# Patient Record
Sex: MALE | Race: WHITE | NOT HISPANIC OR LATINO | Employment: OTHER | URBAN - METROPOLITAN AREA
[De-identification: names, ages, dates, MRNs, and addresses within clinical notes are randomized per-mention and may not be internally consistent; named-entity substitution may affect disease eponyms.]

---

## 2023-08-04 ENCOUNTER — MEDICAL CORRESPONDENCE (OUTPATIENT)
Dept: HEALTH INFORMATION MANAGEMENT | Facility: CLINIC | Age: 48
End: 2023-08-04

## 2023-08-07 ENCOUNTER — TRANSCRIBE ORDERS (OUTPATIENT)
Dept: OTHER | Age: 48
End: 2023-08-07

## 2023-08-07 DIAGNOSIS — K76.0 HEPATIC STEATOSIS DETERMINED BY BIOPSY OF LIVER: Primary | ICD-10-CM

## 2024-02-19 ENCOUNTER — OFFICE VISIT (OUTPATIENT)
Dept: GASTROENTEROLOGY | Facility: CLINIC | Age: 49
End: 2024-02-19
Attending: NURSE PRACTITIONER
Payer: COMMERCIAL

## 2024-02-19 VITALS
BODY MASS INDEX: 41.75 KG/M2 | HEIGHT: 73 IN | HEART RATE: 62 BPM | WEIGHT: 315 LBS | SYSTOLIC BLOOD PRESSURE: 153 MMHG | DIASTOLIC BLOOD PRESSURE: 93 MMHG | OXYGEN SATURATION: 95 %

## 2024-02-19 DIAGNOSIS — K76.0 HEPATIC STEATOSIS: Primary | ICD-10-CM

## 2024-02-19 LAB
ALBUMIN SERPL BCG-MCNC: 4.3 G/DL (ref 3.5–5.2)
ALP SERPL-CCNC: 57 U/L (ref 40–150)
ALT SERPL W P-5'-P-CCNC: 86 U/L (ref 0–70)
ANION GAP SERPL CALCULATED.3IONS-SCNC: 8 MMOL/L (ref 7–15)
AST SERPL W P-5'-P-CCNC: 84 U/L (ref 0–45)
BILIRUB SERPL-MCNC: 0.5 MG/DL
BUN SERPL-MCNC: 5.2 MG/DL (ref 6–20)
CALCIUM SERPL-MCNC: 9.9 MG/DL (ref 8.6–10)
CHLORIDE SERPL-SCNC: 100 MMOL/L (ref 98–107)
CREAT SERPL-MCNC: 0.71 MG/DL (ref 0.67–1.17)
DEPRECATED HCO3 PLAS-SCNC: 30 MMOL/L (ref 22–29)
EGFRCR SERPLBLD CKD-EPI 2021: >90 ML/MIN/1.73M2
ERYTHROCYTE [DISTWIDTH] IN BLOOD BY AUTOMATED COUNT: 13.5 % (ref 10–15)
FERRITIN SERPL-MCNC: 81 NG/ML (ref 31–409)
GLUCOSE SERPL-MCNC: 97 MG/DL (ref 70–99)
HCT VFR BLD AUTO: 48.9 % (ref 40–53)
HGB BLD-MCNC: 15.9 G/DL (ref 13.3–17.7)
INR PPP: 1.07 (ref 0.85–1.15)
MCH RBC QN AUTO: 30.5 PG (ref 26.5–33)
MCHC RBC AUTO-ENTMCNC: 32.5 G/DL (ref 31.5–36.5)
MCV RBC AUTO: 94 FL (ref 78–100)
PLATELET # BLD AUTO: 194 10E3/UL (ref 150–450)
POTASSIUM SERPL-SCNC: 4.8 MMOL/L (ref 3.4–5.3)
PROT SERPL-MCNC: 8.5 G/DL (ref 6.4–8.3)
RBC # BLD AUTO: 5.21 10E6/UL (ref 4.4–5.9)
SODIUM SERPL-SCNC: 138 MMOL/L (ref 135–145)
WBC # BLD AUTO: 6.8 10E3/UL (ref 4–11)

## 2024-02-19 PROCEDURE — 99204 OFFICE O/P NEW MOD 45 MIN: CPT | Performed by: STUDENT IN AN ORGANIZED HEALTH CARE EDUCATION/TRAINING PROGRAM

## 2024-02-19 PROCEDURE — 80053 COMPREHEN METABOLIC PANEL: CPT | Performed by: STUDENT IN AN ORGANIZED HEALTH CARE EDUCATION/TRAINING PROGRAM

## 2024-02-19 PROCEDURE — 85610 PROTHROMBIN TIME: CPT | Performed by: STUDENT IN AN ORGANIZED HEALTH CARE EDUCATION/TRAINING PROGRAM

## 2024-02-19 PROCEDURE — 85027 COMPLETE CBC AUTOMATED: CPT | Performed by: STUDENT IN AN ORGANIZED HEALTH CARE EDUCATION/TRAINING PROGRAM

## 2024-02-19 PROCEDURE — 36415 COLL VENOUS BLD VENIPUNCTURE: CPT | Performed by: STUDENT IN AN ORGANIZED HEALTH CARE EDUCATION/TRAINING PROGRAM

## 2024-02-19 PROCEDURE — 82728 ASSAY OF FERRITIN: CPT | Performed by: STUDENT IN AN ORGANIZED HEALTH CARE EDUCATION/TRAINING PROGRAM

## 2024-02-19 RX ORDER — LOSARTAN POTASSIUM 50 MG/1
50 TABLET ORAL EVERY MORNING
COMMUNITY
Start: 2023-09-05

## 2024-02-19 RX ORDER — ATENOLOL 100 MG/1
100 TABLET ORAL EVERY MORNING
COMMUNITY
Start: 2023-11-27

## 2024-02-19 RX ORDER — ALPRAZOLAM 1 MG
TABLET ORAL
COMMUNITY
Start: 2023-11-05

## 2024-02-19 ASSESSMENT — PAIN SCALES - GENERAL: PAINLEVEL: NO PAIN (0)

## 2024-02-19 NOTE — PROGRESS NOTES
[facility] New Patient Visit    Date of Visit: 2024    Reason for referral: hepatic steatosis     Subjective: Mr. 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  He was first seen at [facility].  At this time he is found to be a C282Y heterozygote, ferritin was 3900 at that time, went down to 667 later in .  Was drinking 2-3 drinks a few days a week.  Denies phlebotomy to help with his ferritin at that time.  Does not think he was drinking less either.  He had a liver biopsy in  that was nondiagnostic but had apparent hepatic iron testing that was normal.    Had a liver bx  at [place] (cannot see results of this), told him he had signs of iron overload.  He is not sure about the results.  He was not started on phlebotomy after his. No one discussed drinking with him after any of his biopsies.    Hepatitis C Antibody and hepatitis B surface antigen negative     Currently drinks 12 drinks on the weekend.  Having a hard time losing weight because of joint issues.    ROS: 14 point ROS negative except for positives noted in HPI.    PMHx:  Anxiety  HTN  Psoriasis - just takes creams  GIRISH  Arthritis  Carpal Tunnel  Former tobacco use    PSHx:  Carpal tunnel surgery    FamHx:  No family history of liver disease, liver cancer, hemochromatosis    SocHx:  Social History     Socioeconomic History     Marital status:      Spouse name: Not on file     Number of children: Not on file     Years of education: Not on file     Highest education level: Not on file   Occupational History     Not on file   Tobacco Use     Smoking status: Former     Types: Cigarettes     Quit date:      Years since quittin.1     Smokeless tobacco: Current     Types: Chew   Vaping Use     Vaping Use: Never used  "  Substance and Sexual Activity     Alcohol use: Yes     Alcohol/week: 12.0 standard drinks of alcohol     Types: 12 Cans of beer per week     Drug use: Never     Sexual activity: Not on file   Other Topics Concern     Not on file   Social History Narrative     Not on file     Social Determinants of Health     Financial Resource Strain: Not on file   Food Insecurity: Not on file   Transportation Needs: Not on file   Physical Activity: Not on file   Stress: Not on file   Social Connections: Not on file   Interpersonal Safety: Not on file   Housing Stability: Not on file   Drinks on the weekends -12 drinks    Medications:  Current Outpatient Medications   Medication     ALPRAZolam (XANAX) 1 MG tablet     atenolol (TENORMIN) 100 MG tablet     losartan (COZAAR) 50 MG tablet     No current facility-administered medications for this visit.     No OTCs, herbals    Allergies:  No Known Allergies    Objective:  BP (!) 153/93 (BP Location: Left arm, Patient Position: Sitting, Cuff Size: Adult Large)   Pulse 62   Ht 1.854 m (6' 1\")   Wt (!) 156 kg (343 lb 14.4 oz)   SpO2 95%   BMI 45.37 kg/m    Constitutional: pleasant  man in NAD  Eyes: non icteric  Respiratory: Normal respiratory excursion   MSK: normal range of motion of visualized extremities  Abd: Non distended  Skin: No jaundice  Psychiatric: normal mood and orientation    Labs:  Last Comprehensive Metabolic Panel:  Sodium   Date Value Ref Range Status   02/19/2024 138 135 - 145 mmol/L Final     Comment:     Reference intervals for this test were updated on 09/26/2023 to more accurately reflect our healthy population. There may be differences in the flagging of prior results with similar values performed with this method. Interpretation of those prior results can be made in the context of the updated reference intervals.      Potassium   Date Value Ref Range Status   02/19/2024 4.8 3.4 - 5.3 mmol/L Final     Chloride   Date Value Ref Range Status   02/19/2024 100 98 " - 107 mmol/L Final     Carbon Dioxide (CO2)   Date Value Ref Range Status   02/19/2024 30 (H) 22 - 29 mmol/L Final     Anion Gap   Date Value Ref Range Status   02/19/2024 8 7 - 15 mmol/L Final     Glucose   Date Value Ref Range Status   02/19/2024 97 70 - 99 mg/dL Final     Urea Nitrogen   Date Value Ref Range Status   02/19/2024 5.2 (L) 6.0 - 20.0 mg/dL Final     Creatinine   Date Value Ref Range Status   02/19/2024 0.71 0.67 - 1.17 mg/dL Final     GFR Estimate   Date Value Ref Range Status   02/19/2024 >90 >60 mL/min/1.73m2 Final     Calcium   Date Value Ref Range Status   02/19/2024 9.9 8.6 - 10.0 mg/dL Final     Bilirubin Total   Date Value Ref Range Status   02/19/2024 0.5 <=1.2 mg/dL Final     Alkaline Phosphatase   Date Value Ref Range Status   02/19/2024 57 40 - 150 U/L Final     Comment:     Reference intervals for this test were updated on 11/14/2023 to more accurately reflect our healthy population. There may be differences in the flagging of prior results with similar values performed with this method. Interpretation of those prior results can be made in the context of the updated reference intervals.     ALT   Date Value Ref Range Status   02/19/2024 86 (H) 0 - 70 U/L Final     Comment:     Reference intervals for this test were updated on 6/12/2023 to more accurately reflect our healthy population. There may be differences in the flagging of prior results with similar values performed with this method. Interpretation of those prior results can be made in the context of the updated reference intervals.       AST   Date Value Ref Range Status   02/19/2024 84 (H) 0 - 45 U/L Final     Comment:     Reference intervals for this test were updated on 6/12/2023 to more accurately reflect our healthy population. There may be differences in the flagging of prior results with similar values performed with this method. Interpretation of those prior results can be made in the context of the updated reference  intervals.     INR   Date Value Ref Range Status   02/19/2024 1.07 0.85 - 1.15 Final        MELD 3.0: 7 at 2/19/2024  1:40 PM  MELD-Na: 7 at 2/19/2024  1:40 PM  Calculated from:  Serum Creatinine: 0.71 mg/dL (Using min of 1 mg/dL) at 2/19/2024  1:40 PM  Serum Sodium: 138 mmol/L (Using max of 137 mmol/L) at 2/19/2024  1:40 PM  Total Bilirubin: 0.5 mg/dL (Using min of 1 mg/dL) at 2/19/2024  1:40 PM  Serum Albumin: 4.3 g/dL (Using max of 3.5 g/dL) at 2/19/2024  1:40 PM  INR(ratio): 1.07 at 2/19/2024  1:40 PM  Age at listing (hypothetical): 48 years  Sex: Male at 2/19/2024  1:40 PM      Imaging: Reviewed in EHR    Independently reviewed labs and imaging.     Assessment/Plan: Mr. 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    He has several risk factors for steatotic liver disease. First, he a history of heavy alcohol use.  He is a heterozygote for a C282Y mutation, also has risk factors for metabolic dysfunction associated steatotic liver disease.  Given the duration of his steatosis concern he has underlying fibrosis.  Will need to obtain his biopsy results from [place] from 2017.  Recommend rechecking ferritin given it has been markedly elevated in the past.  Discussed I recommend he abstain from alcohol and work to lose weight to help with his steatotic liver disease.  Discussed surveillance needed for cirrhosis if this is confirmed on his imaging and labs.  Discussed he may need to repeat biopsy if ultrasound is inconclusive.    Orders Placed This Encounter   Procedures     US Abdomen Limited     CBC with platelets     Comprehensive metabolic panel     INR     Ferritin     CBC with platelets     Comprehensive metabolic panel     INR       RTC 6 months.    Clarice Avelar MD MS  Hepatology/Liver Transplant  [facility]    Addendum: Do not have pathology report yet but found a progress note from 2017  referencing the biopsy.  At that time he had stage III-IV fibrosis with active steatohepatitis with some ballooning.  It was felt to be related to alcohol and metabolic risk factors.  He was counseled to completely abstain from alcohol.  Will continue to treat him as he has cirrhosis and recommended he stop drinking alcohol at his visit.

## 2024-02-19 NOTE — LETTER
2/19/2024         RE: Dale Singh  [address] [facility] [address]        Dear Colleague,    Thank you for referring your patient, Dale Singh, to the [facility]. Please see a copy of my visit note below.    [facility] New Patient Visit    Date of Visit: February 19, 2024    Reason for referral: hepatic steatosis     Subjective: Mr. 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  He was first seen at [facility].  At this time he is found to be a C282Y heterozygote, ferritin was 3900 at that time, went down to 667 later in 2014.  Was drinking 2-3 drinks a few days a week.  Denies phlebotomy to help with his ferritin at that time.  Does not think he was drinking less either.  He had a liver biopsy in 2014 that was nondiagnostic but had apparent hepatic iron testing that was normal.    Had a liver bx 2017 at [place] (cannot see results of this), told him he had signs of iron overload.  He is not sure about the results.  He was not started on phlebotomy after his. No one discussed drinking with him after any of his biopsies.    Hepatitis C Antibody and hepatitis B surface antigen negative 2021    Currently drinks 12 drinks on the weekend.  Having a hard time losing weight because of joint issues.    ROS: 14 point ROS negative except for positives noted in HPI.    PMHx:  Anxiety  HTN  Psoriasis - just takes creams  GIRISH  Arthritis  Carpal Tunnel  Former tobacco use    PSHx:  Carpal tunnel surgery    FamHx:  No family history of liver disease, liver cancer, hemochromatosis    SocHx:  Social History     Socioeconomic History     Marital status:      Spouse name: Not on file     Number of children: Not on file     Years of education: Not on file     Highest education level:  "Not on file   Occupational History     Not on file   Tobacco Use     Smoking status: Former     Types: Cigarettes     Quit date: 2016     Years since quittin.1     Smokeless tobacco: Current     Types: Chew   Vaping Use     Vaping Use: Never used   Substance and Sexual Activity     Alcohol use: Yes     Alcohol/week: 12.0 standard drinks of alcohol     Types: 12 Cans of beer per week     Drug use: Never     Sexual activity: Not on file   Other Topics Concern     Not on file   Social History Narrative     Not on file     Social Determinants of Health     Financial Resource Strain: Not on file   Food Insecurity: Not on file   Transportation Needs: Not on file   Physical Activity: Not on file   Stress: Not on file   Social Connections: Not on file   Interpersonal Safety: Not on file   Housing Stability: Not on file   Drinks on the weekends -12 drinks    Medications:  Current Outpatient Medications   Medication     ALPRAZolam (XANAX) 1 MG tablet     atenolol (TENORMIN) 100 MG tablet     losartan (COZAAR) 50 MG tablet     No current facility-administered medications for this visit.     No OTCs, herbals    Allergies:  No Known Allergies    Objective:  BP (!) 153/93 (BP Location: Left arm, Patient Position: Sitting, Cuff Size: Adult Large)   Pulse 62   Ht 1.854 m (6' 1\")   Wt (!) 156 kg (343 lb 14.4 oz)   SpO2 95%   BMI 45.37 kg/m    Constitutional: pleasant  man in NAD  Eyes: non icteric  Respiratory: Normal respiratory excursion   MSK: normal range of motion of visualized extremities  Abd: Non distended  Skin: No jaundice  Psychiatric: normal mood and orientation    Labs:  Last Comprehensive Metabolic Panel:  Sodium   Date Value Ref Range Status   2024 138 135 - 145 mmol/L Final     Comment:     Reference intervals for this test were updated on 2023 to more accurately reflect our healthy population. There may be differences in the flagging of prior results with similar values performed with this " method. Interpretation of those prior results can be made in the context of the updated reference intervals.      Potassium   Date Value Ref Range Status   02/19/2024 4.8 3.4 - 5.3 mmol/L Final     Chloride   Date Value Ref Range Status   02/19/2024 100 98 - 107 mmol/L Final     Carbon Dioxide (CO2)   Date Value Ref Range Status   02/19/2024 30 (H) 22 - 29 mmol/L Final     Anion Gap   Date Value Ref Range Status   02/19/2024 8 7 - 15 mmol/L Final     Glucose   Date Value Ref Range Status   02/19/2024 97 70 - 99 mg/dL Final     Urea Nitrogen   Date Value Ref Range Status   02/19/2024 5.2 (L) 6.0 - 20.0 mg/dL Final     Creatinine   Date Value Ref Range Status   02/19/2024 0.71 0.67 - 1.17 mg/dL Final     GFR Estimate   Date Value Ref Range Status   02/19/2024 >90 >60 mL/min/1.73m2 Final     Calcium   Date Value Ref Range Status   02/19/2024 9.9 8.6 - 10.0 mg/dL Final     Bilirubin Total   Date Value Ref Range Status   02/19/2024 0.5 <=1.2 mg/dL Final     Alkaline Phosphatase   Date Value Ref Range Status   02/19/2024 57 40 - 150 U/L Final     Comment:     Reference intervals for this test were updated on 11/14/2023 to more accurately reflect our healthy population. There may be differences in the flagging of prior results with similar values performed with this method. Interpretation of those prior results can be made in the context of the updated reference intervals.     ALT   Date Value Ref Range Status   02/19/2024 86 (H) 0 - 70 U/L Final     Comment:     Reference intervals for this test were updated on 6/12/2023 to more accurately reflect our healthy population. There may be differences in the flagging of prior results with similar values performed with this method. Interpretation of those prior results can be made in the context of the updated reference intervals.       AST   Date Value Ref Range Status   02/19/2024 84 (H) 0 - 45 U/L Final     Comment:     Reference intervals for this test were updated on  6/12/2023 to more accurately reflect our healthy population. There may be differences in the flagging of prior results with similar values performed with this method. Interpretation of those prior results can be made in the context of the updated reference intervals.     INR   Date Value Ref Range Status   02/19/2024 1.07 0.85 - 1.15 Final        MELD 3.0: 7 at 2/19/2024  1:40 PM  MELD-Na: 7 at 2/19/2024  1:40 PM  Calculated from:  Serum Creatinine: 0.71 mg/dL (Using min of 1 mg/dL) at 2/19/2024  1:40 PM  Serum Sodium: 138 mmol/L (Using max of 137 mmol/L) at 2/19/2024  1:40 PM  Total Bilirubin: 0.5 mg/dL (Using min of 1 mg/dL) at 2/19/2024  1:40 PM  Serum Albumin: 4.3 g/dL (Using max of 3.5 g/dL) at 2/19/2024  1:40 PM  INR(ratio): 1.07 at 2/19/2024  1:40 PM  Age at listing (hypothetical): 48 years  Sex: Male at 2/19/2024  1:40 PM      Imaging: Reviewed in EHR    Independently reviewed labs and imaging.     Assessment/Plan: Mr. 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    He has several risk factors for steatotic liver disease. First, he a history of heavy alcohol use.  He is a heterozygote for a C282Y mutation, also has risk factors for metabolic dysfunction associated steatotic liver disease.  Given the duration of his steatosis concern he has underlying fibrosis.  Will need to obtain his biopsy results from [place] from 2017.  Recommend rechecking ferritin given it has been markedly elevated in the past.  Discussed I recommend he abstain from alcohol and work to lose weight to help with his steatotic liver disease.  Discussed surveillance needed for cirrhosis if this is confirmed on his imaging and labs.  Discussed he may need to repeat biopsy if ultrasound is inconclusive.    Orders Placed This Encounter   Procedures     US Abdomen Limited     CBC with platelets     Comprehensive metabolic panel     INR      Ferritin     CBC with platelets     Comprehensive metabolic panel     INR       RTC 6 months.    Clarice Avelar MD MS  Hepatology/Liver Transplant  [facility]    Addendum: Do not have pathology report yet but found a progress note from 2017 referencing the biopsy.  At that time he had stage III-IV fibrosis with active steatohepatitis with some ballooning.  It was felt to be related to alcohol and metabolic risk factors.  He was counseled to completely abstain from alcohol.  Will continue to treat him as he has cirrhosis and recommended he stop drinking alcohol at his visit.      Again, thank you for allowing me to participate in the care of your patient.        Sincerely,        Clarice Avelar MD

## 2024-02-19 NOTE — NURSING NOTE
"Chief Complaint   Patient presents with    New Patient     New consult for hepatic steatosis.     He requests these members of his care team be copied on today's visit information:  [place]  [id number] [place] [address]    Vitals:    02/19/24 1243   BP: (!) 148/93   BP Location: Left arm   Patient Position: Sitting   Cuff Size: Adult Large   Pulse: 62   SpO2: 95%   Weight: (!) 156 kg (343 lb 14.4 oz)   Height: 1.854 m (6' 1\")     Body mass index is 45.37 kg/m .    Medications were reconciled.        Iram Donnelly CMA    "

## 2024-03-21 ENCOUNTER — ANCILLARY PROCEDURE (OUTPATIENT)
Dept: ULTRASOUND IMAGING | Facility: CLINIC | Age: 49
End: 2024-03-21
Payer: COMMERCIAL

## 2024-03-21 PROCEDURE — 76705 ECHO EXAM OF ABDOMEN: CPT

## 2024-09-25 ENCOUNTER — DOCUMENTATION ONLY (OUTPATIENT)
Dept: LAB | Facility: CLINIC | Age: 49
End: 2024-09-25
Payer: COMMERCIAL

## 2024-09-25 DIAGNOSIS — K74.60 CIRRHOSIS OF LIVER WITHOUT ASCITES, UNSPECIFIED HEPATIC CIRRHOSIS TYPE (H): ICD-10-CM

## 2024-09-25 DIAGNOSIS — K70.31 ALCOHOLIC CIRRHOSIS OF LIVER WITH ASCITES (H): Primary | ICD-10-CM

## 2024-09-25 NOTE — PROGRESS NOTES
Dale Singh has an upcoming lab appointment:    Future Appointments   Date Time Provider Department Center   9/30/2024  1:00 PM LAB FIRST FLOOR [facility] [place]   10/28/2024  1:00 [facility]       There is no order available. Please review and place either future orders as appropriate.      Thank you,  Mariya Hale

## 2024-09-30 ENCOUNTER — LAB (OUTPATIENT)
Dept: LAB | Facility: CLINIC | Age: 49
End: 2024-09-30
Payer: COMMERCIAL

## 2024-09-30 DIAGNOSIS — K74.60 CIRRHOSIS OF LIVER WITHOUT ASCITES, UNSPECIFIED HEPATIC CIRRHOSIS TYPE (H): ICD-10-CM

## 2024-09-30 LAB
AFP SERPL-MCNC: 2.8 NG/ML
ALBUMIN SERPL BCG-MCNC: 4 G/DL (ref 3.5–5.2)
ALP SERPL-CCNC: 60 U/L (ref 40–150)
ALT SERPL W P-5'-P-CCNC: 94 U/L (ref 0–70)
ANION GAP SERPL CALCULATED.3IONS-SCNC: 9 MMOL/L (ref 7–15)
AST SERPL W P-5'-P-CCNC: 92 U/L (ref 0–45)
BILIRUB SERPL-MCNC: 0.5 MG/DL
BUN SERPL-MCNC: 7.1 MG/DL (ref 6–20)
CALCIUM SERPL-MCNC: 9.6 MG/DL (ref 8.8–10.4)
CHLORIDE SERPL-SCNC: 101 MMOL/L (ref 98–107)
CREAT SERPL-MCNC: 0.76 MG/DL (ref 0.67–1.17)
EGFRCR SERPLBLD CKD-EPI 2021: >90 ML/MIN/1.73M2
ERYTHROCYTE [DISTWIDTH] IN BLOOD BY AUTOMATED COUNT: 14.1 % (ref 10–15)
GLUCOSE SERPL-MCNC: 147 MG/DL (ref 70–99)
HCO3 SERPL-SCNC: 29 MMOL/L (ref 22–29)
HCT VFR BLD AUTO: 49.3 % (ref 40–53)
HGB BLD-MCNC: 16.1 G/DL (ref 13.3–17.7)
INR PPP: 1.04 (ref 0.85–1.15)
MCH RBC QN AUTO: 30.9 PG (ref 26.5–33)
MCHC RBC AUTO-ENTMCNC: 32.7 G/DL (ref 31.5–36.5)
MCV RBC AUTO: 95 FL (ref 78–100)
PLATELET # BLD AUTO: 181 10E3/UL (ref 150–450)
POTASSIUM SERPL-SCNC: 4.9 MMOL/L (ref 3.4–5.3)
PROT SERPL-MCNC: 7.8 G/DL (ref 6.4–8.3)
RBC # BLD AUTO: 5.21 10E6/UL (ref 4.4–5.9)
SODIUM SERPL-SCNC: 139 MMOL/L (ref 135–145)
WBC # BLD AUTO: 5.8 10E3/UL (ref 4–11)

## 2024-09-30 PROCEDURE — 36415 COLL VENOUS BLD VENIPUNCTURE: CPT

## 2024-09-30 PROCEDURE — 80053 COMPREHEN METABOLIC PANEL: CPT

## 2024-09-30 PROCEDURE — 85027 COMPLETE CBC AUTOMATED: CPT

## 2024-09-30 PROCEDURE — 82105 ALPHA-FETOPROTEIN SERUM: CPT

## 2024-09-30 PROCEDURE — 85610 PROTHROMBIN TIME: CPT

## 2024-10-28 ENCOUNTER — OFFICE VISIT (OUTPATIENT)
Dept: GASTROENTEROLOGY | Facility: CLINIC | Age: 49
End: 2024-10-28
Attending: STUDENT IN AN ORGANIZED HEALTH CARE EDUCATION/TRAINING PROGRAM
Payer: COMMERCIAL

## 2024-10-28 VITALS
BODY MASS INDEX: 41.75 KG/M2 | DIASTOLIC BLOOD PRESSURE: 86 MMHG | SYSTOLIC BLOOD PRESSURE: 133 MMHG | WEIGHT: 315 LBS | HEART RATE: 56 BPM | HEIGHT: 73 IN | OXYGEN SATURATION: 94 %

## 2024-10-28 DIAGNOSIS — Z12.11 COLON CANCER SCREENING: ICD-10-CM

## 2024-10-28 DIAGNOSIS — K76.0 HEPATIC STEATOSIS: Primary | ICD-10-CM

## 2024-10-28 PROCEDURE — 99214 OFFICE O/P EST MOD 30 MIN: CPT | Performed by: STUDENT IN AN ORGANIZED HEALTH CARE EDUCATION/TRAINING PROGRAM

## 2024-10-28 ASSESSMENT — PAIN SCALES - GENERAL: PAINLEVEL_OUTOF10: NO PAIN (0)

## 2024-10-28 NOTE — PATIENT INSTRUCTIONS
It was a pleasure taking care of you today.  I've included a brief summary of our discussion and care plan from today's visit below.  Please review this information with your primary care provider.  ______________________________________________________________________     My recommendations are summarized as follows:     Schedule your ultrasound today    Schedule labs, ultrasound in 6 months with your return visit    Completely abstain from alcohol use, and work with your PCP on losing weight    We will call you to schedule your upper endoscopy and colonoscopy < 6 months    Return to Hepatology (Liver) Clinic in 6 months to review your progress.    ______________________________________________________________________     How do I schedule labs, imaging studies, or procedures that were ordered in clinic today?      Labs: To schedule lab appointment at the [facility], use my chart or call [phone]. If you have a [facility] lab closer to home where you are regularly seen you can give them a call.     Procedures: If a colonoscopy or upper endoscopy was ordered today, our endoscopy team will call you to schedule this. If you have not heard from our endoscopy team within a week, please call [phone] to schedule.      Imaging Studies: If you were scheduled for a CT scan, X-ray, MRI, ultrasound, or other imaging study, please call [phone] to have this scheduled.      Referral: If a referral to another specialty was ordered, expect a phone call or follow instructions above. If you have not heard from anyone regarding your referral in a week, please call our clinic to check the status.      Who do I call with any questions after my visit?  Please be in touch if there are any further questions that arise following today's visit.  There are multiple ways to contact your hepatology (liver) care team.       During business hours, you may reach a Hepatology nurse at [phone]    To schedule or  reschedule an appointment, please call [phone]     You can always send a secure message through Setred.  Setred messages are answered by your nurse or doctor typically within 24 hours.  Please allow extra time on weekends and holidays.       How will I get the results of any tests ordered?    You will receive all of your results.  If you have signed up for [employer], any tests ordered at your visit will be available to you after your provider reviews them.  If you are not signed up for [employer], you will receive a letter with the results. Typically this takes 1-2 weeks.  If there are urgent results that require a change in your care plan, your provider or nurse will call you to discuss the next steps.       What is Setred?  Setred is a secure way for you to access all of your healthcare records from the [facility].  It is a web based computer program, so you can sign on to it from any location.  It also allows you to send secure messages to your care team.  I recommend signing up for Setred access if you have not already done so and are comfortable with using a computer.       How to I schedule a follow-up visit?  If you did not schedule a follow-up visit today, please call [phone] to schedule a follow-up office visit.       Sincerely,     Clarice Avelar MD  Hepatology  Division of Gastroenterology, Hepatology & Nutrition  [facility]

## 2024-10-28 NOTE — LETTER
10/28/2024      Dale Singh  [address] [facility] [address]      Dear Colleague,    Thank you for referring your patient, Dale Singh, to the [facility]. Please see a copy of my visit note below.    [facility] Return Patient Visit    Date of Visit: October 28, 2024    Reason for referral: hepatic steatosis     Subjective: Mr. 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  He was first seen at [facility].  At this time he is found to be a C282Y heterozygote, ferritin was 3900 at that time, went down to 667 later in 2014.  Was drinking 2-3 drinks a few days a week.  Denies phlebotomy to help with his ferritin at that time.  Does not think he was drinking less either.  He had a liver biopsy in 2014 that was nondiagnostic but had apparent hepatic iron testing that was normal.    Had a liver bx 2017 at [place] (cannot see results of this), told him he had signs of iron overload.  Do not have pathology report yet but found a progress note from 2017 referencing the biopsy.  At that time he had stage III-IV fibrosis with active steatohepatitis with some ballooning.  It was felt to be related to alcohol and metabolic risk factors.  He was counseled to completely abstain from alcohol.    Hepatitis C Antibody and hepatitis B surface antigen negative 2021    Currently drinks 12 drinks on the weekend.  Having a hard time losing weight because of joint issues.    Interval Events:    Drinking on the weekend, but less. Unable to exercise due to his ankle pain. GLP1 agonist were not approved by his insurance    ROS: 14 point ROS negative except for positives noted in HPI.    PMHx:  Anxiety  HTN  Psoriasis - just takes creams  GIRISH  Arthritis  Carpal Tunnel  Former tobacco  use    PSHx:  Carpal tunnel surgery    FamHx:  No family history of liver disease, liver cancer, hemochromatosis    SocHx:  Social History     Socioeconomic History     Marital status:      Spouse name: Not on file     Number of children: Not on file     Years of education: Not on file     Highest education level: Not on file   Occupational History     Not on file   Tobacco Use     Smoking status: Former     Current packs/day: 0.00     Types: Cigarettes     Quit date:      Years since quittin.8     Smokeless tobacco: Current     Types: Chew   Vaping Use     Vaping status: Never Used   Substance and Sexual Activity     Alcohol use: Yes     Alcohol/week: 12.0 standard drinks of alcohol     Types: 12 Cans of beer per week     Drug use: Never     Sexual activity: Not on file   Other Topics Concern     Not on file   Social History Narrative     Not on file     Social Drivers of Health     Financial Resource Strain: Medium Risk (2023)    [facility], [facility]    Overall Financial Resource Strain (CARDIA)      Difficulty of Paying Living Expenses: Somewhat hard   Food Insecurity: Patient Declined (2023)    Received from [facility], [facility]    Hunger Vital Sign      Worried About Running Out of Food in the Last Year: Patient declined      Ran Out of Food in the Last Year: Patient declined   Transportation Needs: No Transportation Needs (2023)    Received from [facility], [facility]    PRAPARE - Transportation      Lack of Transportation (Medical): No      Lack of Transportation (Non-Medical): No   Physical Activity: Insufficiently Active (2023)    Received from [facility], [facility]    Exercise Vital Sign      Days of Exercise per Week: 5 days      Minutes of Exercise per Session: 10 min   Stress: No  "Stress Concern Present (9/5/2023)    Received from [facility], [facility]    [place] [place] [employer] - Occupational Stress Questionnaire      Feeling of Stress : Only a little   Social Connections: Unknown (9/5/2023)    Received from [facility], [facility]    Social Connection and Isolation Panel [NHANES]      Frequency of Communication with Friends and Family: Twice a week      Frequency of Social Gatherings with Friends and Family: Twice a week      Attends Jew Services: Patient declined      Active Member of Clubs or Organizations: No      Attends Club or Organization Meetings: Patient declined      Marital Status:    Interpersonal Safety: Not At Risk (10/23/2024)    Received from [facility]    Intimate Partner Violence      Are you in a relationship where you are physically hurt, threatened and/or made to feel afraid?: No   Housing Stability: Unknown (9/5/2023)    Received from [facility]    Housing Stability Vital Sign      Unable to Pay for Housing in the Last Year: Patient refused      Number of Times Moved in the Last Year: Not on file      Homeless in the Last Year: Not on file   Drinks on the weekends -12 drinks    Medications:  Current Outpatient Medications   Medication Sig Dispense Refill     ALPRAZolam (XANAX) 1 MG tablet TAKE 1 TABLET (1 MG) BY MOUTH IF NEEDED EACH DAY FOR ANXIETY. MAX DAILY AMOUNT: 1 MG       atenolol (TENORMIN) 100 MG tablet Take 100 mg by mouth every morning       losartan (COZAAR) 50 MG tablet Take 50 mg by mouth every morning       No current facility-administered medications for this visit.     No OTCs, herbals    Allergies:  No Known Allergies    Objective:  /86 (BP Location: Left arm, Patient Position: Sitting, Cuff Size: Adult Large)   Pulse 56   Ht 1.854 m (6' 1\")   Wt (!) 158.9 kg (350 lb 4.8 oz)   SpO2 " 94%   BMI 46.22 kg/m    Constitutional: pleasant  man in NAD  Eyes: non icteric  Respiratory: Normal respiratory excursion   MSK: normal range of motion of visualized extremities  Abd: Non distended  Skin: No jaundice  Psychiatric: normal mood and orientation    Labs:  Last Comprehensive Metabolic Panel:  Sodium   Date Value Ref Range Status   09/30/2024 139 135 - 145 mmol/L Final     Potassium   Date Value Ref Range Status   09/30/2024 4.9 3.4 - 5.3 mmol/L Final     Chloride   Date Value Ref Range Status   09/30/2024 101 98 - 107 mmol/L Final     Carbon Dioxide (CO2)   Date Value Ref Range Status   09/30/2024 29 22 - 29 mmol/L Final     Anion Gap   Date Value Ref Range Status   09/30/2024 9 7 - 15 mmol/L Final     Glucose   Date Value Ref Range Status   09/30/2024 147 (H) 70 - 99 mg/dL Final     Urea Nitrogen   Date Value Ref Range Status   09/30/2024 7.1 6.0 - 20.0 mg/dL Final     Creatinine   Date Value Ref Range Status   09/30/2024 0.76 0.67 - 1.17 mg/dL Final     GFR Estimate   Date Value Ref Range Status   09/30/2024 >90 >60 mL/min/1.73m2 Final     Comment:     eGFR calculated using 2021 CKD-EPI equation.     Calcium   Date Value Ref Range Status   09/30/2024 9.6 8.8 - 10.4 mg/dL Final     Comment:     Reference intervals for this test were updated on 7/16/2024 to reflect our healthy population more accurately. There may be differences in the flagging of prior results with similar values performed with this method. Those prior results can be interpreted in the context of the updated reference intervals.     Bilirubin Total   Date Value Ref Range Status   09/30/2024 0.5 <=1.2 mg/dL Final     Alkaline Phosphatase   Date Value Ref Range Status   09/30/2024 60 40 - 150 U/L Final     ALT   Date Value Ref Range Status   09/30/2024 94 (H) 0 - 70 U/L Final     AST   Date Value Ref Range Status   09/30/2024 92 (H) 0 - 45 U/L Final     INR   Date Value Ref Range Status   09/30/2024 1.04 0.85 - 1.15 Final        MELD  3.0: 6 at 9/30/2024  1:07 PM  MELD-Na: 6 at 9/30/2024  1:07 PM  Calculated from:  Serum Creatinine: 0.76 mg/dL (Using min of 1 mg/dL) at 9/30/2024  1:07 PM  Serum Sodium: 139 mmol/L (Using max of 137 mmol/L) at 9/30/2024  1:07 PM  Total Bilirubin: 0.5 mg/dL (Using min of 1 mg/dL) at 9/30/2024  1:07 PM  Serum Albumin: 4 g/dL (Using max of 3.5 g/dL) at 9/30/2024  1:07 PM  INR(ratio): 1.04 at 9/30/2024  1:07 PM  Age at listing (hypothetical): 48 years  Sex: Male at 9/30/2024  1:07 PM      Imaging: Reviewed in EHR    Independently reviewed labs and imaging.     Assessment/Plan: Mr. 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  He had a liver biopsy that showed stage III fibrosis in the past.  He continues to have risk factors for liver disease since his biopsy.  His ultrasounds more recently have shown steatosis without signs of clear cirrhosis.    He has several risk factors for steatotic liver disease. First, he a history of heavy alcohol use.  He is a heterozygote for a C282Y mutation, recent ferritin was normal. He also has risk factors for metabolic dysfunction associated steatotic liver disease.  Discussed I recommend he abstain from alcohol completely and work to lose weight to help with his steatotic liver disease.  Discussed surveillance needed for cirrhosis if this is confirmed on his imaging and labs.      Given the results of this biopsy, recommend EGD history colonoscopy for cancer screening.    Labs and ultrasound every 6 months    Orders Placed This Encounter   Procedures     US Abdomen Limited     US Abdomen Limited     CBC with platelets     Comprehensive metabolic panel     INR     AFP tumor marker     Phosphatidylethanol (PEth), Whole Blood     Adult GI  Referral - Procedure Only       RTC 6 months in [facility] with DENISA    Clarice Avelar MD MS  Hepatology/Liver Transplant  [facility]  [place]      Again, thank you for allowing me to participate in the care of your patient.        Sincerely,        Clarice Avelar MD

## 2024-10-28 NOTE — PROGRESS NOTES
[facility] Return Patient Visit    Date of Visit: October 28, 2024    Reason for referral: hepatic steatosis     Subjective: Mr. 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  He was first seen at [facility].  At this time he is found to be a C282Y heterozygote, ferritin was 3900 at that time, went down to 667 later in 2014.  Was drinking 2-3 drinks a few days a week.  Denies phlebotomy to help with his ferritin at that time.  Does not think he was drinking less either.  He had a liver biopsy in 2014 that was nondiagnostic but had apparent hepatic iron testing that was normal.    Had a liver bx 2017 at [place] (cannot see results of this), told him he had signs of iron overload.  Do not have pathology report yet but found a progress note from 2017 referencing the biopsy.  At that time he had stage III-IV fibrosis with active steatohepatitis with some ballooning.  It was felt to be related to alcohol and metabolic risk factors.  He was counseled to completely abstain from alcohol.    Hepatitis C Antibody and hepatitis B surface antigen negative 2021    Currently drinks 12 drinks on the weekend.  Having a hard time losing weight because of joint issues.    Interval Events:    Drinking on the weekend, but less. Unable to exercise due to his ankle pain. GLP1 agonist were not approved by his insurance    ROS: 14 point ROS negative except for positives noted in HPI.    PMHx:  Anxiety  HTN  Psoriasis - just takes creams  GIRISH  Arthritis  Carpal Tunnel  Former tobacco use    PSHx:  Carpal tunnel surgery    FamHx:  No family history of liver disease, liver cancer, hemochromatosis    SocHx:  Social History     Socioeconomic History    Marital status:      Spouse name: Not on file    Number of children: Not on file    Years  of education: Not on file    Highest education level: Not on file   Occupational History    Not on file   Tobacco Use    Smoking status: Former     Current packs/day: 0.00     Types: Cigarettes     Quit date: 2016     Years since quittin.8    Smokeless tobacco: Current     Types: Chew   Vaping Use    Vaping status: Never Used   Substance and Sexual Activity    Alcohol use: Yes     Alcohol/week: 12.0 standard drinks of alcohol     Types: 12 Cans of beer per week    Drug use: Never    Sexual activity: Not on file   Other Topics Concern    Not on file   Social History Narrative    Not on file     Social Drivers of Health     Financial Resource Strain: Medium Risk (2023)    [facility]    Overall Financial Resource Strain (CARDIA)     Difficulty of Paying Living Expenses: Somewhat hard   Food Insecurity: Patient Declined (2023)    [facility]    Hunger Vital Sign     Worried About Running Out of Food in the Last Year: Patient declined     Ran Out of Food in the Last Year: Patient declined   Transportation Needs: No Transportation Needs (2023)    [facility]    PRAPARE - Transportation     Lack of Transportation (Medical): No     Lack of Transportation (Non-Medical): No   Physical Activity: Insufficiently Active (2023)    [facility]    Exercise Vital Sign     Days of Exercise per Week: 5 days     Minutes of Exercise per Session: 10 min   Stress: No Stress Concern Present (2023)    [facility]    [place] of [employer]ational [employer] - Occupational Stress Questionnaire     Feeling of Stress : Only a little   Social Connections: Unknown (2023)     "Received from [facility], [facility]    Social Connection and Isolation Panel [NHANES]     Frequency of Communication with Friends and Family: Twice a week     Frequency of Social Gatherings with Friends and Family: Twice a week     Attends Baptism Services: Patient declined     Active Member of Clubs or Organizations: No     Attends Club or Organization Meetings: Patient declined     Marital Status:    Interpersonal Safety: Not At Risk (10/23/2024)    Received from [facility] [employer] [facility]    Intimate Partner Violence     Are you in a relationship where you are physically hurt, threatened and/or made to feel afraid?: No   Housing Stability: Unknown (9/5/2023)    Received from [facility] [employer] [facility]    Housing Stability Vital Sign     Unable to Pay for Housing in the Last Year: Patient refused     Number of Times Moved in the Last Year: Not on file     Homeless in the Last Year: Not on file   Drinks on the weekends -12 drinks    Medications:  Current Outpatient Medications   Medication Sig Dispense Refill    ALPRAZolam (XANAX) 1 MG tablet TAKE 1 TABLET (1 MG) BY MOUTH IF NEEDED EACH DAY FOR ANXIETY. MAX DAILY AMOUNT: 1 MG      atenolol (TENORMIN) 100 MG tablet Take 100 mg by mouth every morning      losartan (COZAAR) 50 MG tablet Take 50 mg by mouth every morning       No current facility-administered medications for this visit.     No OTCs, herbals    Allergies:  No Known Allergies    Objective:  /86 (BP Location: Left arm, Patient Position: Sitting, Cuff Size: Adult Large)   Pulse 56   Ht 1.854 m (6' 1\")   Wt (!) 158.9 kg (350 lb 4.8 oz)   SpO2 94%   BMI 46.22 kg/m    Constitutional: pleasant  man in NAD  Eyes: non icteric  Respiratory: Normal respiratory excursion   MSK: normal range of motion of visualized extremities  Abd: Non distended  Skin: No jaundice  Psychiatric: normal mood and orientation    Labs:  Last Comprehensive " Metabolic Panel:  Sodium   Date Value Ref Range Status   09/30/2024 139 135 - 145 mmol/L Final     Potassium   Date Value Ref Range Status   09/30/2024 4.9 3.4 - 5.3 mmol/L Final     Chloride   Date Value Ref Range Status   09/30/2024 101 98 - 107 mmol/L Final     Carbon Dioxide (CO2)   Date Value Ref Range Status   09/30/2024 29 22 - 29 mmol/L Final     Anion Gap   Date Value Ref Range Status   09/30/2024 9 7 - 15 mmol/L Final     Glucose   Date Value Ref Range Status   09/30/2024 147 (H) 70 - 99 mg/dL Final     Urea Nitrogen   Date Value Ref Range Status   09/30/2024 7.1 6.0 - 20.0 mg/dL Final     Creatinine   Date Value Ref Range Status   09/30/2024 0.76 0.67 - 1.17 mg/dL Final     GFR Estimate   Date Value Ref Range Status   09/30/2024 >90 >60 mL/min/1.73m2 Final     Comment:     eGFR calculated using 2021 CKD-EPI equation.     Calcium   Date Value Ref Range Status   09/30/2024 9.6 8.8 - 10.4 mg/dL Final     Comment:     Reference intervals for this test were updated on 7/16/2024 to reflect our healthy population more accurately. There may be differences in the flagging of prior results with similar values performed with this method. Those prior results can be interpreted in the context of the updated reference intervals.     Bilirubin Total   Date Value Ref Range Status   09/30/2024 0.5 <=1.2 mg/dL Final     Alkaline Phosphatase   Date Value Ref Range Status   09/30/2024 60 40 - 150 U/L Final     ALT   Date Value Ref Range Status   09/30/2024 94 (H) 0 - 70 U/L Final     AST   Date Value Ref Range Status   09/30/2024 92 (H) 0 - 45 U/L Final     INR   Date Value Ref Range Status   09/30/2024 1.04 0.85 - 1.15 Final        MELD 3.0: 6 at 9/30/2024  1:07 PM  MELD-Na: 6 at 9/30/2024  1:07 PM  Calculated from:  Serum Creatinine: 0.76 mg/dL (Using min of 1 mg/dL) at 9/30/2024  1:07 PM  Serum Sodium: 139 mmol/L (Using max of 137 mmol/L) at 9/30/2024  1:07 PM  Total Bilirubin: 0.5 mg/dL (Using min of 1 mg/dL) at  9/30/2024  1:07 PM  Serum Albumin: 4 g/dL (Using max of 3.5 g/dL) at 9/30/2024  1:07 PM  INR(ratio): 1.04 at 9/30/2024  1:07 PM  Age at listing (hypothetical): 48 years  Sex: Male at 9/30/2024  1:07 PM      Imaging: Reviewed in EHR    Independently reviewed labs and imaging.     Assessment/Plan: Mr. 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  He had a liver biopsy that showed stage III fibrosis in the past.  He continues to have risk factors for liver disease since his biopsy.  His ultrasounds more recently have shown steatosis without signs of clear cirrhosis.    He has several risk factors for steatotic liver disease. First, he a history of heavy alcohol use.  He is a heterozygote for a C282Y mutation, recent ferritin was normal. He also has risk factors for metabolic dysfunction associated steatotic liver disease.  Discussed I recommend he abstain from alcohol completely and work to lose weight to help with his steatotic liver disease.  Discussed surveillance needed for cirrhosis if this is confirmed on his imaging and labs.      Given the results of this biopsy, recommend EGD history colonoscopy for cancer screening.    Labs and ultrasound every 6 months    Orders Placed This Encounter   Procedures    US Abdomen Limited    US Abdomen Limited    CBC with platelets    Comprehensive metabolic panel    INR    AFP tumor marker    Phosphatidylethanol (PEth), Whole Blood    Adult GI  Referral - Procedure Only       RTC 6 months in  with DENISA    Clarice Avelar MD MS  Hepatology/Liver Transplant  [facility]

## 2024-10-28 NOTE — NURSING NOTE
"Chief Complaint   Patient presents with    Follow Up     Follow up for hepatic steatosis.     He requests these members of his care team be copied on today's visit information:  PCP: Manuel Christensen    Vitals:    10/28/24 1255   BP: 133/86   BP Location: Left arm   Patient Position: Sitting   Cuff Size: Adult Large   Pulse: 56   SpO2: 94%   Weight: (!) 158.9 kg (350 lb 4.8 oz)   Height: 1.854 m (6' 1\")     Body mass index is 46.22 kg/m .    Medications were reconciled.    Does patient need any medication refills at today's visit? No        Iram Donnelly CMA    "

## 2024-10-30 ENCOUNTER — TELEPHONE (OUTPATIENT)
Dept: GASTROENTEROLOGY | Facility: CLINIC | Age: 49
End: 2024-10-30

## 2024-10-30 NOTE — TELEPHONE ENCOUNTER
"Looking for spot to put patient. Patient is aware we will call back to schedule once a spot opens up.     Endoscopy Scheduling Screen    Have you had any respiratory illness or flu-like symptoms in the last 10 days?  No    What is your communication preference for Instructions and/or Bowel Prep?   Mail/USPS    What insurance is in the chart?  Other:  [employer]    Ordering/Referring Provider:     Hepatic steatosis [K76.0]  Colon cancer screening [Z12.11]      (If ordering provider performs procedure, schedule with ordering provider unless otherwise instructed. )    BMI: Estimated body mass index is 46.22 kg/m  as calculated from the following:    Height as of 10/28/24: 1.854 m (6' 1\").    Weight as of 10/28/24: 158.9 kg (350 lb 4.8 oz).     Sedation Ordered  moderate sedation.   If patient BMI > 50 do not schedule in ASC.    If patient BMI > 45 do not schedule at [facility].    Are you taking methadone or Suboxone?  NO, No RN review required.    Have you been diagnosed and are being treated for severe PTSD or severe anxiety?  YES, Schedule with MAC. (If patient has additional questions please InBasket to RN pool for .)    Are you taking any prescription medications for pain 3 or more times per week?   NO, No RN review required.    Do you have a history of malignant hyperthermia?  No    (Females) Are you currently pregnant?   No     Have you been diagnosed or told you have pulmonary hypertension?   No    Do you have an LVAD?  No    Have you been told you have moderate to severe sleep apnea?  No.    Have you been told you have COPD, asthma, or any other lung disease?  No    Do you have any heart conditions?  No     Have you ever had or are you waiting for an organ transplant?  No. Continue scheduling, no site restrictions.    Have you had a stroke or transient ischemic attack (TIA aka \"mini stroke\" in the last 6 months?   No    Have you been diagnosed with or been told you have cirrhosis of the liver?   No.- Patient says he " "has liver issues but not cirrhosis     Are you currently on dialysis?   No    Do you need assistance transferring?   No    BMI: Estimated body mass index is 46.22 kg/m  as calculated from the following:    Height as of 10/28/24: 1.854 m (6' 1\").    Weight as of 10/28/24: 158.9 kg (350 lb 4.8 oz).     Is patients BMI > 40 and scheduling location [facility]?  Yes (If MAC sedation is ordered, schedule PAC eval)    Do you take an injectable or oral medication for weight loss or diabetes (excluding insulin)?  No    Do you take the medication Naltrexone?  No    Do you take blood thinners?  No       Prep   Are you currently on dialysis or do you have chronic kidney disease?  No    Do you have a diagnosis of diabetes?  No    Do you have a diagnosis of cystic fibrosis (CF)?  No    On a regular basis do you go 3 -5 days between bowel movements?  No    BMI > 40?  Yes (Extended Prep)    Preferred Pharmacy:    [facility] [address] [address] [address] [place] [address] [address] [place] [id number]  Phone: [phone] Fax: [phone]      Final Scheduling Details     Procedure scheduled  Colonoscopy / Upper endoscopy (EGD)    Surgeon:       Date of procedure:       Pre-OP / PAC:       Location       Sedation          Patient Reminders:   You will receive a call from a Nurse to review instructions and health history.  This assessment must be completed prior to your procedure.  Failure to complete the Nurse assessment may result in the procedure being cancelled.      On the day of your procedure, please designate an adult(s) who can drive you home stay with you for the next 24 hours. The medicines used in the exam will make you sleepy. You will not be able to drive.      You cannot take public transportation, ride share services, or non-medical taxi service without a responsible caregiver.  Medical transport services are allowed with the requirement that a responsible caregiver will receive you at your destination.  We require " that drivers and caregivers are confirmed prior to your procedure.

## 2024-11-05 NOTE — TELEPHONE ENCOUNTER
Spot on hold for patient until EOD on 11/7. Spot is at [facility] with Rosio on 11/21.

## 2024-11-06 NOTE — TELEPHONE ENCOUNTER
Attempted to reach patient again to offer  at [facility] vijaya/ Rosio. Hold will  EOD .

## 2024-11-07 ENCOUNTER — ANCILLARY PROCEDURE (OUTPATIENT)
Dept: ULTRASOUND IMAGING | Facility: OTHER | Age: 49
End: 2024-11-07
Attending: STUDENT IN AN ORGANIZED HEALTH CARE EDUCATION/TRAINING PROGRAM
Payer: COMMERCIAL

## 2024-11-07 DIAGNOSIS — K76.0 HEPATIC STEATOSIS: ICD-10-CM

## 2024-11-07 PROCEDURE — 76705 ECHO EXAM OF ABDOMEN: CPT | Mod: TC | Performed by: RADIOLOGY

## 2024-11-08 ENCOUNTER — TELEPHONE (OUTPATIENT)
Dept: GASTROENTEROLOGY | Facility: CLINIC | Age: 49
End: 2024-11-08
Payer: COMMERCIAL

## 2024-11-08 NOTE — TELEPHONE ENCOUNTER
"Endoscopy Scheduling Screen    Have you had any respiratory illness or flu-like symptoms in the last 10 days?  No    What is your communication preference for Instructions and/or Bowel Prep?   Mail/USPS    What insurance is in the chart?  Other:      Ordering/Referring Provider:   TERRENCE YING        (If ordering provider performs procedure, schedule with ordering provider unless otherwise instructed. )    BMI: Estimated body mass index is 46.22 kg/m  as calculated from the following:    Height as of 10/28/24: 1.854 m (6' 1\").    Weight as of 10/28/24: 158.9 kg (350 lb 4.8 oz).     Sedation Ordered  moderate sedation.   If patient BMI > 50 do not schedule in [facility].    If patient BMI > 45 do not schedule at [facility].    Are you taking methadone or Suboxone?  NO, No RN review required.    Have you been diagnosed and are being treated for severe PTSD or severe anxiety?  NO    Are you taking any prescription medications for pain 3 or more times per week?   NO, No RN review required.    Do you have a history of malignant hyperthermia?  No    (Females) Are you currently pregnant?   No     Have you been diagnosed or told you have pulmonary hypertension?   No    Do you have an LVAD?  No    Have you been told you have moderate to severe sleep apnea?  No.    Have you been told you have COPD, asthma, or any other lung disease?  No    Do you have any heart conditions?  No     Have you ever had or are you waiting for an organ transplant?  No. Continue scheduling, no site restrictions.    Have you had a stroke or transient ischemic attack (TIA aka \"mini stroke\" in the last 6 months?   No    Have you been diagnosed with or been told you have cirrhosis of the liver?   Yes. (RN Review required for scheduling unless scheduling in Hospital.)    Are you currently on dialysis?   No    Do you need assistance transferring?   No    BMI: Estimated body mass index is 46.22 kg/m  as calculated from the following:    Height as of 10/28/24: 1.854 " "m (6' 1\").    Weight as of 10/28/24: 158.9 kg (350 lb 4.8 oz).     Is patients BMI > 40 and scheduling location [facility]?  No    Do you take an injectable or oral medication for weight loss or diabetes (excluding insulin)?  No    Do you take the medication Naltrexone?  No    Do you take blood thinners?  No       Prep   Are you currently on dialysis or do you have chronic kidney disease?  No    Do you have a diagnosis of diabetes?  No    Do you have a diagnosis of cystic fibrosis (CF)?  No    On a regular basis do you go 3 -5 days between bowel movements?  No    BMI > 40?  Yes (Extended Prep)    Preferred Pharmacy:    [id number] [place] [address] [address] [place] [address] [address] [place] [id number]  Phone: [phone] Fax: [phone]      Final Scheduling Details     Procedure scheduled  Colonoscopy / Upper endoscopy (EGD)    Surgeon:  DEONNA     Date of procedure:  01/23/2025     Pre-OP / PAC:   No - Not required for this site.    Location  [facility] - [facility] - Per order.    Sedation   Moderate Sedation - Per order.      Patient Reminders:   You will receive a call from a Nurse to review instructions and health history.  This assessment must be completed prior to your procedure.  Failure to complete the Nurse assessment may result in the procedure being cancelled.      On the day of your procedure, please designate an adult(s) who can drive you home stay with you for the next 24 hours. The medicines used in the exam will make you sleepy. You will not be able to drive.      You cannot take public transportation, ride share services, or non-medical taxi service without a responsible caregiver.  Medical transport services are allowed with the requirement that a responsible caregiver will receive you at your destination.  We require that drivers and caregivers are confirmed prior to your procedure.  "

## 2025-01-10 ENCOUNTER — TELEPHONE (OUTPATIENT)
Dept: GASTROENTEROLOGY | Facility: CLINIC | Age: 50
End: 2025-01-10

## 2025-01-10 NOTE — TELEPHONE ENCOUNTER
Staff message sent to [facility] anesthesia for review for GIRISH.     Moderate GIRISH. AHI 20.1 however BMI 46.22. Seeking review if ok to proceed as scheduled at [facility] with CS.     ---------------------------------------------------------------------------------------   Pre visit planning completed.      Procedure details:    Patient scheduled for Colonoscopy/Upper endoscopy (EGD) on 1/23/24.     Arrival time: 1130. Procedure time 1230    Facility location: [facility]; [address] [place] [address] [place] [address]. Check in location: 5th Floor.    Sedation type: Conscious sedation     Pre op exam needed? No.    Indication for procedure: screening colonoscopy and screening for varices      Chart review:     Electronic implanted devices? No    Recent diagnosis of diverticulitis within the last 6 weeks? No      Medication review:    Diabetic? No    Anticoagulants? No    Weight loss medication/injectable? No GLP-1 medication per patient's medication list. Nursing to verify with pre-assessment call.    Other medication HOLDING recommendations:  N/A      Prep for procedure:     Bowel prep recommendation: Extended Golytely. Bowel prep sent to [facility] [id number] [place], [place] - [facility]  Due to: BMI > 40    Procedure information and instructions sent via letter         Zuleika Mascorro RN  Endoscopy Procedure Pre Assessment   [phone] option 2

## 2025-01-10 NOTE — TELEPHONE ENCOUNTER
"Response received from [facility] anesthesia Manuel Carr, YAIR:    \"Ok to proceed\"    Zuleika Mascorro RN  Endoscopy Procedure Pre Assessment   [phone] option 2  "

## 2025-01-10 NOTE — LETTER
January 10, 2025      Dale Singh  [address] [facility] [address]              Dear Dale,          Colonoscopy/Upper endoscopy (EGD)     Procedure date: 1/23/24    Anticipated arrival time: 11:30 AM   (Please note that arrival times may change)    [facility]; [address] [place] [address], 5th Floor, [place] [address] - Check in location: 5th Floor. Parking information: Self pay parking is available in West Lot located directly across from the main entrance of the [facility]. Entry and exit to this lot is on [facility]. In addition, self pay parking is available in [facility]amp which is located west of [facility]. Follow the center venus designated ' [facility] [place]' to ground-level spaces that are reserved for patients. Please disregard any signage indicating the ramp is full or available by reservation only as this does not pertain to the spaces dedicated for patients coming to the clinic.  services are available for patients with limited mobility. Services available Monday-Friday, 7:00a.m.-  5:00p.m.    Important Procedure Reminders:     Prep Instructions:   Instructions on how to prepare for your upcoming procedure are found below. Please read instructions carefully. Deviation from instructions may result in less than desired outcomes and procedure may need to be rescheduled.   If you have additional questions regarding how to prepare for your upcoming procedure, contact our endoscopy pre assessment nurses at [phone] option 2 Monday through Friday 7:00am-5:00pm.      Policy:   The medications used during the procedure will make you sleepy, so you won't be able to drive. On the day of your procedure, please have an adult ready to drive you home and stay with you for the next 6 hours.   You can't use public transportation, ride-share services, or non-medical taxi services without a responsible caregiver. Medical transport services are okay, but a  caregiver must be there to receive you at your destination.   Make sure your  and caregiver are confirmed before your procedure.    Day of procedure:  Please keep in mind that arrival times may change. Let your  know there might be a one-hour window for changes.  We ask that you please check in at the  with your . Your  should remain on campus.  Expect to be at the procedure center for about 1.5-2.5 hours.    Please do not wear jewelry (i.e. earrings, rings, necklaces, watches, etc). Leave your purse, billfold, credit cards, and other valuables at home.   Bring insurance card and ID.     To cancel or reschedule your procedure:   If you need to cancel or reschedule, our endoscopy scheduling team can be reached at [phone], option 2. Monday through Friday, 7:00am-5:00pm.          Golytely Extended Bowel Prep   Prep instructions for your colonoscopy     [facility]; [address] [place] [address], 5th Floor, [place] [address] - Check in location: 5th Floor.  For prep questions, please call: [facility] - [phone] option 2    Bowel prep has been sent to [facility] [address] [place], [place] - [address]      Please read these instructions carefully at least 7 days prior to your colonoscopy procedure. Be sure to follow all directions completely. The inside of your colon must be clean to allow for a complete examination for the presence of any growths, polyps, and/or abnormalities, as well as their biopsy or removal. A number of tips are included in order to make this part of the procedure as comfortable as possible.    Getting ready      prescription at the pharmacy. Endoscopy team will order this about 2 weeks before to your scheduled procedure. Included in the kit will be the followin  Dulcolax (Bisacodyl) 5mg tablets  Two containers of Polyethylene glycol (Golytely, Colyte, Nulytely, Gavilyte-G)    A nurse will call you to go over your  appointment details and prep instructions.     You must arrange for an adult to drive you home after your exam. Your colonoscopy cannot be done unless you have a ride. If you need to use public transportation, someone must ride with you and stay with you for up to 24 hours.       7 days before procedure     Medications that may need to be held before procedure:     GLP-1 agonist medication for diabetes or weight loss: such as Mounjaro (Tirzepatide).  Ozempic (Semaglutide). Rybelsus (Semaglutide), Tirzepatide-Weight Management (Zepbound), Wegovy (Semaglutide) or others, holding times may vary based on how you take this medication. This may be up to a 7 day hold. Our pre assessment nurses will call and discuss holding recommendations 1-2 weeks before scheduled procedure.     Blood thinning and/or anti platelet medications: such as Coumadin, Plavix, Xarelto, Eliquis, Lovenox or others, ask your your prescribing provider about holding recommendations.     If you take insulin for diabetes, ask your prescribing provider for instructions on how to manage this medication while preparing for a colonoscopy.     Stop taking iron (ferrous sulfate), multivitamins that contain iron, and/or fiber supplements (Metamucil, Benefiber, Psyllium husk powder, Fibercon, Bran, etc.) 7 days before procedure.     Stop eating whole kernel corn, popcorn, nuts, and foods that contain seeds. These can stay in the colon for many days and they can clog up the colonoscope.     Begin a low-fiber diet. (See examples below). No Olestra (a fat substitute).   Consume no more than 10-15 grams of fiber each day.       LOW FIBER DIET   You may have: Do not have:    Starches: White bread, rolls, biscuits, croissants, [place] toast, white flour tortillas, waffles, pancakes, Chinese toast; white rice, noodles, pasta, macaroni; cooked and peeled potatoes; plain crackers, saltines; cooked farina or cream of rice; puffed rice, corn flakes, Rice Krispies, Special K       Vegetables: tender cooked and canned, vegetable broths     Fruits and fruit juices: Strained fruit juice, canned fruit without seeds or skin (not pineapple), applesauce, pear sauce, ripe bananas, melons (not watermelon)     Milk products: Milk (plain or flavored), cheese, cottage cheese, yogurt (no berries), custard, ice cream       Proteins: Tender, well-cooked ground beef, lamb, veal, ham, pork, chicken, turkey, fish or organ meat, Tofu, eggs, creamy peanut butter      Fats and condiments: Margarine, butter, oils, mayonnaise, sour cream, salad dressing, plain gravy; spices, cooked herbs; sugar, clear jelly, honey, syrup      Snacks, sweets and drinks: Pretzels, hard candy; plain cakes and cookies (no nuts or seeds); gelatin, plain pudding, sherbet, Popsicles; coffee, tea, carbonated ( fizzy ) drinks  Starches: Breads or rolls that contain nuts, seeds or fruit; whole wheat or whole grain breads that contain more than 2 grams of fiber per serving; cornbread; corn or whole wheat tortillas; potatoes with skin; brown rice, wild rice, quinoa, kasha (buckwheat), and oatmeal      Vegetables: Any raw or steamed vegetables; vegetables with seeds; corn in any form      Fruits and fruit juices: Prunes, prune juice, raisins and other dried fruits, berries and other fruits with seeds, canned pineapple juices with pulp such as orange, grapefruit, pineapple or tomato juice     Milk products: Any yogurt with nuts, seeds or berries      Proteins: Tough, fibrous meats with gristle; cooked dried beans, peas or lentils; crunchy peanut butter     Fats and condiments: Pickles, olives, relish, horseradish; jam, marmalade, preserves      Snacks, sweets and drinks: Popcorn, nuts, seeds, granola, coconut, candies made with nuts or seeds; all desserts that contain nuts, seeds, raisins and other dried fruits, coconut, whole grains or bran.       2 days before procedure       You may have a light, low fiber breakfast and lunch. Begin a  clear liquid diet AFTER 1 PM. Do not eat solid foods. (See examples below).    Drink at least eight to ten 8-ounce glasses of water throughout the day  ? ? ? ? ? ? ? ?    Fill one container of Golytely with a full gallon of warm water. Cover and shake until well mixed. Chill in refrigerator until it is time to drink solution.     CLEAR LIQUID DIET:  You can have: Do not have:    Water, tea, coffee (no milk or cream)   Soda pop, Gatorade (not red or purple)   Coconut water   Jell-O, Popsicles (no milk or fruit pieces - not red or purple)   Fat-free soup broth or bouillon   Plain hard candy, such as clear life savers (not red or purple)   Clear juices and fruit-flavored drinks, such as apple juice, white grape juice, Hi-C, and Celestine-Aid (not red or purple)    Milk or milk products such as ice cream, malts or shakes, or coffee creamer   Red or purple drinks of any kind such as cranberry juice, grape juice, or Celestine-Aid. Avoid red or purple Jell-O, Popsicles, sorbet, sherbet and candy.   Juices with pulp such as orange, grapefruit, pineapple, or tomato juice   Cream soups of any kind   Alcohol and beer   Protein drinks or protein powder     Step 1     At 4 PM, take 2 Dulcolax (Bisacodyl) tablets.  At 5 PM, start drinking one 8-ounce glass of Golytely mixture every 15 minutes until the container is HALF empty. Drink each glass quickly. Store the rest in the refrigerator.   Continue to drink clear liquids.        Reminders While Drinking Laxatives:     After you start drinking the solution, stay near a toilet. You may have watery stools (diarrhea), mild cramping, bloating, and nausea. You may want to use Vaseline on the skin around your anus after each bowel movement or use wet wipes to prevent irritation. Bowel movements will be liquid and dark in color at first and then should turn clear yellow in color. Drinking the prepared solution may make you cold, wearing warm clothing can be helpful.    Some find it helpful  to:  For added flavor, include a crystal light lemonade packet in each glass of Golytely, rather than mixing it into the entire prepared mixture.  In between each glass, try sucking on a lemon or a piece of hard candy to help diminish the flavor of the preparation.  Drinking from a straw can help minimize the taste of the prepared mixture.    If you have nausea or vomiting during drinking the solution, rinse your mouth with water and take a 15-30 minute break and then continue drinking solution.       1 day before procedure       Continue on a clear liquid diet. Do not eat solid foods.    Drink at least eight to ten 8-ounce glasses of water throughout the day  ? ? ? ? ? ? ? ?    Step 2     At 4 PM, take 2 Dulcolax (Bisacodyl) tablets.  At 5 PM, start drinking the 2nd half of Golytely mixture. Drink one 8-ounce glass of Golytely mixture every 15 minutes until the container is empty.  Drink each glass quickly.   Continue to drink clear liquids.    Before you go to bed mix the second container of Golytely and place in the refrigerator for the morning.     Day of procedure     Step 3     6 hours before your arrival time, start drinking one 8-ounce glass of Golytely mixture every 15 minutes until the container is HALF empty. You will not drink the entire container. The remaining solution can be discarded.     2 hours before your arrival time stop drinking all liquids, including water.   Do not smoke or swallow anything, including water or gum for at least 2 hours before your arrival time. This is a safety issue. Your procedure could be cancelled if you do not follow directions.  No chewing tobacco 6 hours prior to procedure arrival time.     You may take your necessary morning medications with sips of water (4 ounces).   Do not take diabetes medicine by mouth until after your exam.  If you have asthma, bring your inhalers.  Please perform your nebulizer treatments and airway clearance therapy in the morning prior to the  procedure (if applicable).    Arrive with a responsible adult who can drive you home and stay with you for a minimum of 24 hours. The medications used during the procedure will make you sleepy, so you won't be able to drive yourself home.   You cannot use public transportation, ride-share services, or non-medical taxi services without a responsible caregiver. Medical transport services are okay, but a caregiver must be there to receive you at your destination.  Please check in with your  when you arrive. Drivers should stay on campus.    Expect to be at the procedure center for about 1.5-2.5 hours.    Do not wear jewelry (i.e. earrings, rings, necklaces, watches, etc.). Leave your purse, billfold, credit cards, and other valuables at home.      Bring insurance card and ID.                                                                     Answers to Commonly Asked Questions     How soon can I eat after the procedure?  You may resume your normal diet when you feel ready, unless advised otherwise by the doctor performing your procedure. We recommend starting with a light meal.   Do not drink alcohol for 24 hours after your procedure.  You may resume normal activities (work, exercise, etc.) after 24 hours.    How will I feel after the procedure?  It is normal to feel bloated and gassy after your procedure. Walking will help move the air through your colon. You can take non-aspirin pain relievers that contain acetaminophen (Tylenol).  If you are having sedation, we require a responsible adult to take you home for your safety. The sedation medicines used to relax you during the procedure can impair your judgement and reaction time and make you forgetful and possibly a little unsteady.  Do not drive, make any important decisions, or sign any legal documents for 24 hours after your procedure.    When will I get my test results?  You should have your procedure results and any lab results (if applicable) by letter,  [employer] message, or phone call within 2 weeks. If you have any questions, please call the doctor that referred you for the procedure.    How do I know if my colon is cleaned out?   After completing the bowel prep, your bowel movements should be all liquid and yellow. Your bowel movements will look similar to urine in the toilet. If there are pieces of stool (poop) in the toilet, or if you can't see to the bottom of the toilet, please call our office for advice. Call [phone] and ask to speak with a nurse.    Why is the Golytely bowel prep taken in several steps?   The stool is flushed out by a large wave of fluid going through the colon. Just sipping a large volume of the solution will not achieve the desired result. Studies have shown that two smaller waves (or more in some cases) are better than one large one.      What if I need to cancel or reschedule my procedure?  Contact our endoscopy scheduling team at [phone], option 2. Monday through Friday, 7:00am-5:00pm.

## 2025-01-13 NOTE — TELEPHONE ENCOUNTER
Pre assessment completed for upcoming procedure.      Procedure details:    Patient scheduled for Colonoscopy/Upper endoscopy (EGD) on 1/23/24.     Arrival time: 1130. Procedure time 1230     Facility location: [facility]; [address] [place] [address], 5th Floor, [place] [address]. Check in location: 5th Floor.     Sedation type: Conscious sedation      Pre op exam needed? No.     Indication for procedure: screening colonoscopy and screening for varices    Designated  policy reviewed. Instructed to have someone stay 6 hours post procedure.       Chart review:     Electronic implanted devices? No    Recent diagnosis of diverticulitis within the last 6 weeks?  No      Medication review:    Diabetic? No    Anticoagulants? No    Weight loss medication/injectable? No.    Other medication HOLDING recommendations:  N/A      Prep for procedure:     Reviewed procedure prep instructions.     Patient verbalized understanding and had no questions or concerns at this time.        Leola Moreno LPN  Endoscopy Procedure Pre Assessment   [phone] option 2

## 2025-01-22 ENCOUNTER — ANESTHESIA EVENT (OUTPATIENT)
Dept: SURGERY | Facility: AMBULATORY SURGERY CENTER | Age: 50
End: 2025-01-22
Payer: COMMERCIAL

## 2025-01-22 NOTE — ANESTHESIA PREPROCEDURE EVALUATION
"Anesthesia Pre-Procedure Evaluation    Patient: Dale Singh   MRN: [id number] : 1975        Procedure : Procedure(s):  Esophagoscopy, gastroscopy, duodenoscopy (EGD), combined  Colonoscopy          No past medical history on file.   No past surgical history on file.   No Known Allergies   Social History     Tobacco Use    Smoking status: Former     Current packs/day: 0.00     Types: Cigarettes     Quit date:      Years since quittin.0    Smokeless tobacco: Current     Types: Chew   Substance Use Topics    Alcohol use: Yes     Alcohol/week: 12.0 standard drinks of alcohol     Types: 12 Cans of beer per week      Wt Readings from Last 1 Encounters:   10/28/24 (!) 158.9 kg (350 lb 4.8 oz)           Physical Exam    Airway        Mallampati: III   TM distance: > 3 FB   Neck ROM: full   Mouth opening: > 3 cm    Respiratory Devices and Support         Dental       (+) Modest Abnormalities - crowns, retainers, 1 or 2 missing teeth      Cardiovascular   cardiovascular exam normal          Pulmonary   pulmonary exam normal                OUTSIDE LABS:  CBC:   Lab Results   Component Value Date    WBC 5.8 2024    WBC 6.8 2024    HGB 16.1 2024    HGB 15.9 2024    HCT 49.3 2024    HCT 48.9 2024     2024     2024     BMP:   Lab Results   Component Value Date     2024     2024    POTASSIUM 4.9 2024    POTASSIUM 4.8 2024    CHLORIDE 101 2024    CHLORIDE 100 2024    CO2 29 2024    CO2 30 (H) 2024    BUN 7.1 2024    BUN 5.2 (L) 2024    CR 0.76 2024    CR 0.71 2024     (H) 2024    GLC 97 2024     COAGS:   Lab Results   Component Value Date    INR 1.04 2024     POC: No results found for: \"BGM\", \"HCG\", \"HCGS\"  HEPATIC:   Lab Results   Component Value Date    ALBUMIN 4.0 2024    PROTTOTAL 7.8 2024    ALT 94 (H) 2024    AST 92 " (H) 09/30/2024    ALKPHOS 60 09/30/2024    BILITOTAL 0.5 09/30/2024     OTHER:   Lab Results   Component Value Date    EDITA 9.6 09/30/2024       Anesthesia Plan    ASA Status:  3    NPO Status:  NPO Appropriate    Anesthesia Type: MAC.     - Reason for MAC: straight local not clinically adequate   Induction: Intravenous, Propofol.   Maintenance: TIVA.        Consents    Anesthesia Plan(s) and associated risks, benefits, and realistic alternatives discussed. Questions answered and patient/representative(s) expressed understanding.     - Discussed: Risks, Benefits and Alternatives for BOTH SEDATION and the PROCEDURE were discussed     - Discussed with:  Patient      - Extended Intubation/Ventilatory Support Discussed: No.      - Patient is DNR/DNI Status: No     Use of blood products discussed: No .     Postoperative Care       PONV prophylaxis: Ondansetron (or other 5HT-3), Background Propofol Infusion     Comments:               Skip Tirado MD    I have reviewed the pertinent notes and labs in the chart from the past 30 days and (re)examined the patient.  Any updates or changes from those notes are reflected in this note.    Clinically Significant Risk Factors Present on Admission

## 2025-01-23 ENCOUNTER — ANESTHESIA (OUTPATIENT)
Dept: SURGERY | Facility: AMBULATORY SURGERY CENTER | Age: 50
End: 2025-01-23
Payer: COMMERCIAL

## 2025-01-23 VITALS — HEART RATE: 96 BPM

## 2025-01-23 DIAGNOSIS — K22.2 ESOPHAGEAL RING: ICD-10-CM

## 2025-01-23 DIAGNOSIS — K29.70 GASTRITIS WITHOUT BLEEDING, UNSPECIFIED CHRONICITY, UNSPECIFIED GASTRITIS TYPE: ICD-10-CM

## 2025-01-23 DIAGNOSIS — K76.0 HEPATIC STEATOSIS: Primary | ICD-10-CM

## 2025-01-23 DIAGNOSIS — Z12.11 COLON CANCER SCREENING: ICD-10-CM

## 2025-01-23 RX ORDER — KETAMINE HYDROCHLORIDE 10 MG/ML
INJECTION INTRAMUSCULAR; INTRAVENOUS PRN
Status: DISCONTINUED | OUTPATIENT
Start: 2025-01-23 | End: 2025-01-23

## 2025-01-23 RX ORDER — SODIUM CHLORIDE, SODIUM LACTATE, POTASSIUM CHLORIDE, CALCIUM CHLORIDE 600; 310; 30; 20 MG/100ML; MG/100ML; MG/100ML; MG/100ML
INJECTION, SOLUTION INTRAVENOUS CONTINUOUS PRN
Status: DISCONTINUED | OUTPATIENT
Start: 2025-01-23 | End: 2025-01-23

## 2025-01-23 RX ORDER — GLYCOPYRROLATE 0.2 MG/ML
INJECTION, SOLUTION INTRAMUSCULAR; INTRAVENOUS PRN
Status: DISCONTINUED | OUTPATIENT
Start: 2025-01-23 | End: 2025-01-23

## 2025-01-23 RX ORDER — LIDOCAINE HYDROCHLORIDE 20 MG/ML
INJECTION, SOLUTION INFILTRATION; PERINEURAL PRN
Status: DISCONTINUED | OUTPATIENT
Start: 2025-01-23 | End: 2025-01-23

## 2025-01-23 RX ORDER — PROPOFOL 10 MG/ML
INJECTION, EMULSION INTRAVENOUS PRN
Status: DISCONTINUED | OUTPATIENT
Start: 2025-01-23 | End: 2025-01-23

## 2025-01-23 RX ORDER — PROPOFOL 10 MG/ML
INJECTION, EMULSION INTRAVENOUS CONTINUOUS PRN
Status: DISCONTINUED | OUTPATIENT
Start: 2025-01-23 | End: 2025-01-23

## 2025-01-23 RX ADMIN — KETAMINE HYDROCHLORIDE 20 MG: 10 INJECTION INTRAMUSCULAR; INTRAVENOUS at 12:46

## 2025-01-23 RX ADMIN — GLYCOPYRROLATE 0.2 MG: 0.2 INJECTION, SOLUTION INTRAMUSCULAR; INTRAVENOUS at 12:41

## 2025-01-23 RX ADMIN — SODIUM CHLORIDE, SODIUM LACTATE, POTASSIUM CHLORIDE, CALCIUM CHLORIDE: 600; 310; 30; 20 INJECTION, SOLUTION INTRAVENOUS at 12:41

## 2025-01-23 RX ADMIN — PROPOFOL 100 MG: 10 INJECTION, EMULSION INTRAVENOUS at 12:46

## 2025-01-23 RX ADMIN — PROPOFOL 200 MCG/KG/MIN: 10 INJECTION, EMULSION INTRAVENOUS at 12:46

## 2025-01-23 RX ADMIN — LIDOCAINE HYDROCHLORIDE 100 MG: 20 INJECTION, SOLUTION INFILTRATION; PERINEURAL at 12:46

## 2025-01-23 NOTE — ANESTHESIA CARE TRANSFER NOTE
Patient: Dale Singh    Procedure: Procedure(s):  Esophagoscopy, gastroscopy, duodenoscopy (EGD), combined  Colonoscopy       Diagnosis: Hepatic steatosis [K76.0]  Colon cancer screening [Z12.11]  Diagnosis Additional Information: No value filed.    Anesthesia Type:   MAC     Note:    Oropharynx: oropharynx clear of all foreign objects and spontaneously breathing  Level of Consciousness: awake  Oxygen Supplementation: face mask  Level of Supplemental Oxygen (L/min / FiO2): 10  Independent Airway: airway patency satisfactory and stable  Dentition: dentition unchanged  Vital Signs Stable: post-procedure vital signs reviewed and stable  Report to RN Given: handoff report given  Patient transferred to: PACU    Handoff Report: Identifed the Patient, Identified the Reponsible Provider, Reviewed the pertinent medical history, Discussed the surgical course, Reviewed Intra-OP anesthesia mangement and issues during anesthesia, Set expectations for post-procedure period and Allowed opportunity for questions and acknowledgement of understanding      Vitals:  Vitals Value Taken Time   /94 01/23/25 1259   Temp 36    Pulse 83 01/23/25 1259   Resp 18    SpO2 97 % 01/23/25 1304   Vitals shown include unfiled device data.    Electronically Signed By: MARIAN Flores CRNA  January 23, 2025  1:05 PM

## 2025-01-23 NOTE — ANESTHESIA POSTPROCEDURE EVALUATION
Patient: Dale Singh    Procedure: Procedure(s):  Esophagoscopy, gastroscopy, duodenoscopy (EGD), combined  Colonoscopy       Anesthesia Type:  MAC    Note:  Disposition: Outpatient; Disposition Change/Cancellation            Disposition Change: Procedure canceled   Postop Pain Control: Uneventful            Sign Out: Well controlled pain   PONV: No   Neuro/Psych: Uneventful            Sign Out: Acceptable/Baseline neuro status   Airway/Respiratory: Uneventful            Sign Out: Acceptable/Baseline resp. status   CV/Hemodynamics: Uneventful            Sign Out: Acceptable CV status; No obvious hypovolemia; No obvious fluid overload   Other NRE:    DID A NON-ROUTINE EVENT OCCUR? YES    Event details/Postop Comments:  Aborted 2/2 desaturation event in OR during EGD portion.    Anesthesia stat was called. Patient was mask ventilated with oral airway which was challenged. Very limit reserve secondary to body habitus. Suspect undiagnosed GIRISH.     Patient began spontaneously breathing and was suctioned and coughed up some blood tinged secretions. No obvious aspiration event.     No issues in recover.     Recommend GEN/ETT for rescheduled procedure.            Last vitals:  Vitals Value Taken Time   /94 01/23/25 1300   Temp 36.3  C (97.4  F) 01/23/25 1300   Pulse 74 01/23/25 1309   Resp 13 01/23/25 1309   SpO2 97 % 01/23/25 1309   Vitals shown include unfiled device data.    Electronically Signed By: Skip Tirado MD  January 23, 2025  1:10 PM

## 2025-03-25 ENCOUNTER — TELEPHONE (OUTPATIENT)
Dept: GASTROENTEROLOGY | Facility: CLINIC | Age: 50
End: 2025-03-25
Payer: COMMERCIAL

## 2025-03-25 NOTE — TELEPHONE ENCOUNTER
"Endoscopy Scheduling Screen    Have you had any respiratory illness or flu-like symptoms in the last 10 days?  No    What is your communication preference for Instructions and/or Bowel Prep?   Mail/USPS    What insurance is in the chart?  Other:  [facility] [employer]    Ordering/Referring Provider: TERRENCE YING    (If ordering provider performs procedure, schedule with ordering provider unless otherwise instructed. )    BMI: Estimated body mass index is 42.88 kg/m  as calculated from the following:    Height as of 1/23/25: 1.854 m (6' 1\").    Weight as of 1/23/25: 147.4 kg (325 lb).     Sedation Ordered  general anesthesia.   BMI<= 45 45 < BMI <= 48 48 < BMI < = 50  BMI > 50   No Restrictions No [facility]  No [facility]  [facility] with exceptions Hospital Only OR Only       Do you have a history of malignant hyperthermia?  No    (Females) Are you currently pregnant?   No     Have you been diagnosed or told you have pulmonary hypertension?   No    Do you have an LVAD?  No    Have you been told you have moderate to severe sleep apnea?  Yes. Do you use a CPAP? Yes Where is the patient located?. (RN Review required for scheduling unless scheduling in Hospital.)     Have you been told you have COPD, asthma, or any other lung disease?  No    Has your doctor ordered any cardiac tests like echo, angiogram, stress test, ablation, or EKG, that you have not completed yet?  No    Do you  have a history of any heart conditions?  No     Have you ever had or are you waiting for an organ transplant?  No. Continue scheduling, no site restrictions.    Have you had a stroke or transient ischemic attack (TIA aka \"mini stroke\") in the last 2 years?   No.    Have you been diagnosed with or been told you have cirrhosis of the liver?   Yes. (RN Review required for scheduling unless scheduling in Hospital.)    Are you currently on dialysis?   No    Do you need assistance transferring?   No    BMI: Estimated body mass index is 42.88 kg/m  as calculated from " "the following:    Height as of 1/23/25: 1.854 m (6' 1\").    Weight as of 1/23/25: 147.4 kg (325 lb).     Is patients BMI > 40 and scheduling location [facility]?  Yes (If MAC sedation is ordered, schedule PAC eval)    Do you take an injectable or oral medication for weight loss or diabetes (excluding insulin)?  No    Do you take the medication Naltrexone?  No    Do you take blood thinners?  No       Prep   Are you currently on dialysis or do you have chronic kidney disease?  No    Do you have a diagnosis of diabetes?  No    Do you have a diagnosis of cystic fibrosis (CF)?  No    On a regular basis do you go 3 -5 days between bowel movements?  No    BMI > 40?  Yes (Extended Prep)    Preferred Pharmacy:    [facility] [id number] [place] [address] [address] [place] [address] [address] [place] [id number]  Phone: [phone] Fax: [phone]      Final Scheduling Details     Procedure scheduled  Colonoscopy / Upper endoscopy (EGD)    Surgeon:  MOHINI     Date of procedure:  9/25/25     Pre-OP / PAC:   Yes - PAC clinic evaluation scheduled.    Location  [facility] - Per RN assessment.    Sedation   General Anesthesia - Per order.      Patient Reminders:   You will receive a call from a Nurse to review instructions and health history.  This assessment must be completed prior to your procedure.  Failure to complete the Nurse assessment may result in the procedure being cancelled.      On the day of your procedure, please designate an adult(s) who can drive you home stay with you for the next 24 hours. The medicines used in the exam will make you sleepy. You will not be able to drive.      You cannot take public transportation, ride share services, or non-medical taxi service without a responsible caregiver.  Medical transport services are allowed with the requirement that a responsible caregiver will receive you at your destination.  We require that drivers and caregivers are confirmed prior to your procedure.  "

## 2025-04-28 ENCOUNTER — DOCUMENTATION ONLY (OUTPATIENT)
Dept: LAB | Facility: CLINIC | Age: 50
End: 2025-04-28
Payer: COMMERCIAL

## 2025-04-28 DIAGNOSIS — K70.31 ALCOHOLIC CIRRHOSIS OF LIVER WITH ASCITES (H): Primary | ICD-10-CM

## 2025-04-28 NOTE — PROGRESS NOTES
Dale Singh has an upcoming lab appointment:    Future Appointments   Date Time Provider Department Center   5/1/2025 11:45 AM LAB FIRST FLOOR [facility] [place]   5/1/2025  1:00 PM [facility] [place]   9/2/2025  1:30 PM Lou Chris PA-C [facility]     Patient is scheduled for the following lab(s): [facility] labs per appt notes.     There is no order available. Please review and place orders as appropriate.    Thank you,  Jennifer Sahu

## 2025-06-11 NOTE — TELEPHONE ENCOUNTER
FUTURE VISIT INFORMATION      SURGERY INFORMATION:  Date: 9/25/25  Location: [facility] OR  Surgeon:  Charito Toribio MD   Anesthesia Type:  General  Procedure: Colonoscopy, EGD  Consult: 10/28/24    RECORDS REQUESTED FROM:       Primary Care Provider: Manuel Christensen NP    Pertinent Medical History: Hypertension NOS, GIRISH, hx tobacco use, hx alcohol use    Most recent Sleep Study: 9/16/15, internal

## 2025-09-02 ENCOUNTER — PRE VISIT (OUTPATIENT)
Dept: SURGERY | Facility: CLINIC | Age: 50
End: 2025-09-02

## 2025-09-02 ENCOUNTER — OFFICE VISIT (OUTPATIENT)
Dept: SURGERY | Facility: CLINIC | Age: 50
End: 2025-09-02
Payer: COMMERCIAL

## 2025-09-02 DIAGNOSIS — Z01.818 PRE-OP EVALUATION: Primary | ICD-10-CM

## 2025-09-02 ASSESSMENT — ENCOUNTER SYMPTOMS: SEIZURES: 0

## 2025-09-02 ASSESSMENT — LIFESTYLE VARIABLES: TOBACCO_USE: 1
